# Patient Record
Sex: MALE | Race: WHITE | NOT HISPANIC OR LATINO | URBAN - METROPOLITAN AREA
[De-identification: names, ages, dates, MRNs, and addresses within clinical notes are randomized per-mention and may not be internally consistent; named-entity substitution may affect disease eponyms.]

---

## 2017-02-09 ENCOUNTER — OFFICE VISIT (OUTPATIENT)
Dept: FAMILY MEDICINE | Facility: CLINIC | Age: 47
End: 2017-02-09

## 2017-02-09 VITALS
DIASTOLIC BLOOD PRESSURE: 91 MMHG | HEIGHT: 68 IN | TEMPERATURE: 97.5 F | WEIGHT: 197.75 LBS | SYSTOLIC BLOOD PRESSURE: 137 MMHG | HEART RATE: 75 BPM | OXYGEN SATURATION: 100 % | BODY MASS INDEX: 29.97 KG/M2

## 2017-02-09 DIAGNOSIS — R05.3 PERSISTENT COUGH FOR 3 WEEKS OR LONGER: Primary | ICD-10-CM

## 2017-02-09 DIAGNOSIS — J04.0 LARYNGITIS: ICD-10-CM

## 2017-02-09 RX ORDER — AZITHROMYCIN 250 MG/1
TABLET, FILM COATED ORAL
Qty: 6 TABLET | Refills: 0 | Status: SHIPPED | OUTPATIENT
Start: 2017-02-09 | End: 2018-07-18

## 2017-02-09 RX ORDER — PREDNISONE 20 MG/1
20 TABLET ORAL DAILY
Qty: 5 TABLET | Refills: 0 | Status: SHIPPED | OUTPATIENT
Start: 2017-02-09 | End: 2019-07-03

## 2017-02-09 ASSESSMENT — PAIN SCALES - GENERAL: PAINLEVEL: NO PAIN (0)

## 2017-02-09 NOTE — NURSING NOTE
"46 year old  Chief Complaint   Patient presents with     Cough     x3 weeks productive in the morning during the day dry cough        Blood pressure 137/91, pulse 75, temperature 97.5  F (36.4  C), temperature source Oral, height 5' 8\" (172.7 cm), weight 197 lb 12 oz (89.699 kg), SpO2 100 %. Body mass index is 30.07 kg/(m^2).  Patient Active Problem List   Diagnosis     Essential hypertension with goal blood pressure less than 140/90       Wt Readings from Last 2 Encounters:   02/09/17 197 lb 12 oz (89.699 kg)   08/29/16 196 lb 1.9 oz (88.959 kg)     BP Readings from Last 3 Encounters:   02/09/17 137/91   08/29/16 137/85   04/25/16 198/134         Current Outpatient Prescriptions   Medication     lisinopril (PRINIVIL,ZESTRIL) 10 MG tablet     ketoconazole (NIZORAL) 200 MG tablet     No current facility-administered medications for this visit.       Social History   Substance Use Topics     Smoking status: Never Smoker      Smokeless tobacco: Never Used     Alcohol Use: Yes       Health Maintenance Due   Topic Date Due     LIPID SCREEN Q5 YR MALE (SYSTEM ASSIGNED)  06/05/2006     INFLUENZA VACCINE (SYSTEM ASSIGNED)  09/01/2016       No results found for this basename: pap      February 9, 2017 10:20 AM  "

## 2017-02-09 NOTE — PROGRESS NOTES
"CHIEF COMPLAINT:  Persistent cough.      HISTORY OF PRESENT ILLNESS:  Wiflredo is a 46-year-old who has been sick for about 3 weeks.  When it started, he had classic influenza-like symptoms (i.e., sudden onset, significant cough, body aches, chills, sense that he had been \"hit by a truck\", etc.) with the exception of no fever.  Since then, he has had a persistent cough.  It is worse in the morning.  It is often productive of dark brown sputum.  He had tried over-the-counter naproxen to help with the frequency of the cough and that helped.  However, it is still there, somewhat painful, productive of brown sputum.      No body aches or chills.  No fever.  No facial pain or pressure.  Throat feels fine, although he has now got some laryngitis.      He has essential hypertension and blood pressure is typically controlled with lisinopril 10 mg daily.  He has been on that now for about 6 months.  It is generally working and he is certain that his cough is not related to side effects.  He has not had the dry cough that we would expect.  He is not a smoker.      His 15-year-old son was sick, his wife then got sick, then Wilfredo got sick.  They have recovered fine.      OBJECTIVE:  Wilfredo is in no distress.  Blood pressure more or less acceptable at this point at 137/91, which is a marked improvement from 198/134 back in April.  Pulse 75 and regular.  Temperature is 97.5.  He is 5 feet 8 and weighs 197 with a BMI of 30.1.  Weight is essentially what it was in August.  O2 sats 100% on room air.  He was coughing frequently throughout our visit.  He was not using any accessory muscles.  He looked at rest and no diaphoresis or tachypnea present.  Examination reveals no pain with palpation of the frontal or maxillary sinuses.  Nose is free of any congestion.  His eyes look fine.  Ears show no cerumen in the canals.  The TMs look good.  Mucous membranes are moist.  Throat does not look erythematous.  No anterior or posterior chain " adenopathy.  His voice does have a hoarse/laryngitic quality to it.  Lungs reveal clear breath sounds throughout.  However, deep inspiration followed by exhalation was enough to cause quite a bit of coughing.  Chest x-ray not obtained.  No labs at this point.      ASSESSMENT AND PLAN:  Persistent cough of 3 weeks or longer.  I am a bit concerned about atypical bacteria.  Nothing to overtly suggest a walking pneumonia, therefore, perhaps bronchitis due to atypical bacteria.     1.  We are going to go with prednisone 20 mg once daily to help decrease the coughing and also help with his laryngitis.  He will start that today.   2.  Azithromycin 250 mg, 2 tablets together now and 1 daily for the next 4 days.  This antibiotic, coupled with its strong anti-inflammatory effect, should be enough to turn the corner for him.   3.  He will call me with any problems or questions.  I would recommend relative vocal rest as much as he can.

## 2017-02-09 NOTE — MR AVS SNAPSHOT
After Visit Summary   2/9/2017    Wilfredo Ochoa    MRN: [id number]           Patient Information     Date Of Birth          1970        Visit Information        Provider Department      2/9/2017 10:20 AM Shiv Us MD [facility]        Today's Diagnoses     Persistent cough for 3 weeks or longer    -  1     Laryngitis            Follow-ups after your visit        Follow-up notes from your care team     Return if symptoms worsen or fail to improve.      Who to contact     Please call your clinic at [phone] to:    Ask questions about your health    Make or cancel appointments    Discuss your medicines    Learn about your test results    Speak to your doctor   If you have compliments or concerns about an experience at your clinic, or if you wish to file a complaint, please contact [facility] Patient Relations at [phone] or email us at [email or web address]         Additional Information About Your Visit        MyChart Information     [employer] gives you secure access to your electronic health record. If you see a primary care provider, you can also send messages to your care team and make appointments. If you have questions, please call your primary care clinic.  If you do not have a primary care provider, please call [phone] and they will assist you.      [employer] is an electronic gateway that provides easy, online access to your medical records. With [employer], you can request a clinic appointment, read your test results, renew a prescription or communicate with your care team.     To access your existing account, please contact your [facility] or call [phone] for assistance.        Care EveryWhere ID     This is your Care EveryWhere ID. This could be used by other organizations to access your [facility] medical records  [id number]        Your Vitals Were     Pulse Temperature Height BMI (Body Mass  "Index) Pulse Oximetry       75 97.5  F (36.4  C) (Oral) 5' 8\" (172.7 cm) 30.07 kg/m2 100%        Blood Pressure from Last 3 Encounters:   02/09/17 137/91   08/29/16 137/85   04/25/16 198/134    Weight from Last 3 Encounters:   02/09/17 197 lb 12 oz (89.699 kg)   08/29/16 196 lb 1.9 oz (88.959 kg)   04/25/16 199 lb (90.266 kg)              Today, you had the following     No orders found for display         Today's Medication Changes          These changes are accurate as of: 2/9/17 10:51 AM.  If you have any questions, ask your nurse or doctor.               Start taking these medicines.        Dose/Directions    azithromycin 250 MG tablet   Commonly known as:  ZITHROMAX   Used for:  Persistent cough for 3 weeks or longer   Started by:  Shiv Us MD        Take 2 po together now, then one daily for 4 more days   Quantity:  6 tablet   Refills:  0       predniSONE 20 MG tablet   Commonly known as:  DELTASONE   Used for:  Persistent cough for 3 weeks or longer   Started by:  Shiv Us MD        Dose:  20 mg   Take 1 tablet (20 mg) by mouth daily   Quantity:  5 tablet   Refills:  0            Where to get your medicines      These medications were sent to [facility] [address] [place], [place] - [address]  [address], [place] [address]     Phone:  [phone]    - azithromycin 250 MG tablet  - predniSONE 20 MG tablet             Primary Care Provider Office Phone # Fax #    Shiv Us [phone]       [facility] [address]  [facility] [address]        Thank you!     Thank you for choosing [facility]  for your care. Our goal is always to provide you with excellent care. Hearing back from our patients is one way we can continue to improve our services. Please take a few minutes to complete the written survey that you may receive in the mail after your visit with us. Thank you!             Your Updated Medication List - Protect others " around you: Learn how to safely use, store and throw away your medicines at www.disposemymeds.org.          This list is accurate as of: 2/9/17 10:51 AM.  Always use your most recent med list.                   Brand Name Dispense Instructions for use    azithromycin 250 MG tablet    ZITHROMAX    6 tablet    Take 2 po together now, then one daily for 4 more days       ketoconazole 200 MG tablet    NIZORAL    2 tablet    Take 1 po once only; then exercise to the point of sweating ~2 hours later.  Repeat in ~2 weeks if needed.       lisinopril 10 MG tablet    PRINIVIL/ZESTRIL    90 tablet    Take 1 tablet (10 mg) by mouth daily       predniSONE 20 MG tablet    DELTASONE    5 tablet    Take 1 tablet (20 mg) by mouth daily

## 2017-09-15 DIAGNOSIS — I10 ESSENTIAL HYPERTENSION WITH GOAL BLOOD PRESSURE LESS THAN 140/90: ICD-10-CM

## 2017-09-15 RX ORDER — LISINOPRIL 10 MG/1
10 TABLET ORAL DAILY
Qty: 90 TABLET | Refills: 0 | Status: SHIPPED | OUTPATIENT
Start: 2017-09-15 | End: 2017-11-08

## 2017-11-08 DIAGNOSIS — I10 ESSENTIAL HYPERTENSION WITH GOAL BLOOD PRESSURE LESS THAN 140/90: ICD-10-CM

## 2017-11-08 RX ORDER — LISINOPRIL 10 MG/1
10 TABLET ORAL DAILY
Qty: 90 TABLET | Refills: 0 | Status: SHIPPED | OUTPATIENT
Start: 2017-11-08 | End: 2018-03-05

## 2017-11-08 NOTE — TELEPHONE ENCOUNTER
Medication is being filled for 1 time refill only due to:  Patient needs labs BMP. -already ordered

## 2018-03-05 DIAGNOSIS — I10 ESSENTIAL HYPERTENSION WITH GOAL BLOOD PRESSURE LESS THAN 140/90: ICD-10-CM

## 2018-03-05 NOTE — TELEPHONE ENCOUNTER
Routing refill request to provider for review/approval because:  Anel given x1 and patient did not follow up, please advise  Labs not current:  Bmp, future already placed   Patient needs to be seen because it has been more than 1 year since last office visit.    Janett Oconnor RN  March 5, 2018 3:24 PM

## 2018-03-06 RX ORDER — LISINOPRIL 10 MG/1
10 TABLET ORAL DAILY
Qty: 30 TABLET | Refills: 0 | Status: SHIPPED | OUTPATIENT
Start: 2018-03-06 | End: 2018-05-09

## 2018-05-09 ENCOUNTER — OFFICE VISIT (OUTPATIENT)
Dept: FAMILY MEDICINE | Facility: CLINIC | Age: 48
End: 2018-05-09
Payer: COMMERCIAL

## 2018-05-09 VITALS
HEART RATE: 78 BPM | TEMPERATURE: 98.1 F | WEIGHT: 200 LBS | HEIGHT: 68 IN | SYSTOLIC BLOOD PRESSURE: 143 MMHG | BODY MASS INDEX: 30.31 KG/M2 | DIASTOLIC BLOOD PRESSURE: 87 MMHG | OXYGEN SATURATION: 96 %

## 2018-05-09 DIAGNOSIS — Z71.84 COUNSELING FOR TRAVEL: ICD-10-CM

## 2018-05-09 DIAGNOSIS — Z01.84 IMMUNITY STATUS TESTING: ICD-10-CM

## 2018-05-09 DIAGNOSIS — I10 ESSENTIAL HYPERTENSION WITH GOAL BLOOD PRESSURE LESS THAN 140/90: Primary | ICD-10-CM

## 2018-05-09 RX ORDER — LISINOPRIL 10 MG/1
10 TABLET ORAL DAILY
Qty: 90 TABLET | Refills: 4 | Status: SHIPPED | OUTPATIENT
Start: 2018-05-09 | End: 2019-07-03

## 2018-05-09 ASSESSMENT — PAIN SCALES - GENERAL: PAINLEVEL: NO PAIN (0)

## 2018-05-09 NOTE — MR AVS SNAPSHOT
"              After Visit Summary   5/9/2018    Wilfredo Ochoa    MRN: [id number]           Patient Information     Date Of Birth          1970        Visit Information        Provider Department      5/9/2018 10:40 AM Shiv Us MD [facility]         Follow-ups after your visit        Who to contact     Please call your clinic at [phone] to:    Ask questions about your health    Make or cancel appointments    Discuss your medicines    Learn about your test results    Speak to your doctor            Additional Information About Your Visit        MyChart Information     [employer] gives you secure access to your electronic health record. If you see a primary care provider, you can also send messages to your care team and make appointments. If you have questions, please call your primary care clinic.  If you do not have a primary care provider, please call [phone] and they will assist you.      [employer] is an electronic gateway that provides easy, online access to your medical records. With [facility], you can request a clinic appointment, read your test results, renew a prescription or communicate with your care team.     To access your existing account, please contact your [facility] or call [phone] for assistance.        Care EveryWhere ID     This is your Care EveryWhere ID. This could be used by other organizations to access your [facility] medical records  [id number]        Your Vitals Were     Pulse Temperature Height Pulse Oximetry BMI (Body Mass Index)       78 98.1  F (36.7  C) (Temporal) 5' 7.99\" (172.7 cm) 96% 30.42 kg/m2        Blood Pressure from Last 3 Encounters:   05/09/18 143/87   02/09/17 (!) 137/91   08/29/16 137/85    Weight from Last 3 Encounters:   05/09/18 200 lb (90.7 kg)   02/09/17 197 lb 12 oz (89.7 kg)   08/29/16 196 lb 1.9 oz (89 kg)              Today, you had the following     No orders found for display       Primary Care " Provider Office Phone # Fax #    Shiv Us -[phone]       [address] [address]        Equal Access to Services     JOSEPH GRAVES : Hadii aad ku haddennisfunmilayo Srinivasan, waterryda luqadaha, qaybta kalizyda sera, randell christensen fredisrene gan teresa crowe. [facility] [phone].    ATENCIÓN: Si habla español, tiene a hurley disposición servicios gratuitos de asistencia lingüística. Llame al [phone].    We comply with applicable federal civil rights laws and [place] laws. We do not discriminate on the basis of race, color, national origin, age, disability, sex, sexual orientation, or gender identity.            Thank you!     Thank you for choosing [facility]  for your care. Our goal is always to provide you with excellent care. Hearing back from our patients is one way we can continue to improve our services. Please take a few minutes to complete the written survey that you may receive in the mail after your visit with us. Thank you!             Your Updated Medication List - Protect others around you: Learn how to safely use, store and throw away your medicines at www.disposemymeds.org.          This list is accurate as of 5/9/18 11:11 AM.  Always use your most recent med list.                   Brand Name Dispense Instructions for use Diagnosis    azithromycin 250 MG tablet    ZITHROMAX    6 tablet    Take 2 po together now, then one daily for 4 more days    Persistent cough for 3 weeks or longer       ketoconazole 200 MG tablet    NIZORAL    2 tablet    Take 1 po once only; then exercise to the point of sweating ~2 hours later.  Repeat in ~2 weeks if needed.    Pityriasis rosea       lisinopril 10 MG tablet    PRINIVIL/ZESTRIL    30 tablet    Take 1 tablet (10 mg) by mouth daily Needs labs and visit  for further refills    Essential hypertension with goal blood pressure less than 140/90       predniSONE 20 MG tablet    DELTASONE    5 tablet    Take 1 tablet (20 mg)  by mouth daily    Persistent cough for 3 weeks or longer

## 2018-05-09 NOTE — NURSING NOTE
Screening Questionnaire for Adult Immunization    Are you sick today?   No   Do you have allergies to medications, food, a vaccine component or latex?   No   Have you ever had a serious reaction after receiving a vaccination?   No   Do you have a long-term health problem with heart disease, lung disease, asthma, kidney disease, metabolic disease (e.g. diabetes), anemia, or other blood disorder?   No   Do you have cancer, leukemia, HIV/AIDS, or any other immune system problem?   No   In the past 3 months, have you taken medications that affect  your immune system, such as prednisone, other steroids, or anticancer drugs; drugs for the treatment of rheumatoid arthritis, Crohn s disease, or psoriasis; or have you had radiation treatments?   No   Have you had a seizure, or a brain or other nervous system problem?   No   During the past year, have you received a transfusion of blood or blood     products, or been given immune (gamma) globulin or antiviral drug?   No   For women: Are you pregnant or is there a chance you could become        pregnant during the next month?   No   Have you received any vaccinations in the past 4 weeks?   No     Immunization questionnaire answers were all negative.        Per orders of Dr. Us, injection of Typhoid given by Leida Iraheta. Patient instructed to remain in clinic for 15 minutes afterwards, and to report any adverse reaction to me immediately.       Screening performed by Leida Iraheta on 5/9/2018 at 1:09 PM.

## 2018-05-09 NOTE — NURSING NOTE
"47 year old  Chief Complaint   Patient presents with     Recheck Medication     Needs refill on BP Rx.        Blood pressure 143/87, pulse 78, temperature 98.1  F (36.7  C), temperature source Temporal, height 5' 7.99\" (172.7 cm), weight 200 lb (90.7 kg), SpO2 96 %. Body mass index is 30.42 kg/(m^2).  Patient Active Problem List   Diagnosis     Essential hypertension with goal blood pressure less than 140/90       Wt Readings from Last 2 Encounters:   05/09/18 200 lb (90.7 kg)   02/09/17 197 lb 12 oz (89.7 kg)     BP Readings from Last 3 Encounters:   05/09/18 143/87   02/09/17 (!) 137/91   08/29/16 137/85         Current Outpatient Prescriptions   Medication     azithromycin (ZITHROMAX) 250 MG tablet     ketoconazole (NIZORAL) 200 MG tablet     lisinopril (PRINIVIL/ZESTRIL) 10 MG tablet     predniSONE (DELTASONE) 20 MG tablet     No current facility-administered medications for this visit.        Social History   Substance Use Topics     Smoking status: Never Smoker     Smokeless tobacco: Never Used     Alcohol use Yes       Health Maintenance Due   Topic Date Due     HIV SCREEN (SYSTEM ASSIGNED)  06/05/1989     LIPID SCREEN Q5 YR MALE (SYSTEM ASSIGNED)  06/05/2006       No results found for: KATHY Iraheta MA  May 9, 2018 10:54 AM    "

## 2018-05-10 LAB — HAV IGG SER QL IA: REACTIVE

## 2018-05-15 NOTE — PROGRESS NOTES
CHIEF COMPLAINT:  Hypertension followup.      HISTORY OF PRESENT ILLNESS:  Wilfredo is a 47-year-old with essential hypertension.  He is currently on lisinopril 10 mg daily.  He takes it daily.  His weight is up just a little bit from a little over a year ago from 197 to 200.  He is not as physically active as he would like to be.  He has had a lot of stress.  His father was diagnosed with CLL not too long ago and treated it.  He had some back pain recently and has now been diagnosed with multiple myeloma.  There has been some thought about having Wilfredo undergo some genetic counseling and he will look into this down the road.      In addition to the above, he is traveling to [place] this summer with his organization, [employer].  He had a previous hepatitis A immunization but never the second one though this is unclear.  We will go ahead and check an antibody titer.  He needs a typhoid booster.  We discussed taking Pepto-Bismol once daily starting the day before the trip, throughout his time in [place], and perhaps the day after that.      OBJECTIVE:  Wilfredo is in no distress.  Affect upbeat.  /87.        ASSESSMENT AND PLAN:     1.  He will continue to check his blood pressure at home, something he has not been doing regularly.  We can easily increase the lisinopril from 10 to 20 mg but we will hold off on doing so for now until we know a little bit more.      2.  Travel counseling.  Typhoid immunization given today.  This will be good for 2-3 years of protection.  Hepatitis A antibody titer pending.  Call with any problems or questions.

## 2018-07-18 DIAGNOSIS — R05.3 PERSISTENT COUGH FOR 3 WEEKS OR LONGER: ICD-10-CM

## 2018-07-18 RX ORDER — AZITHROMYCIN 250 MG/1
TABLET, FILM COATED ORAL
Qty: 6 TABLET | Refills: 0 | Status: SHIPPED | OUTPATIENT
Start: 2018-07-18 | End: 2019-07-03

## 2018-07-19 DIAGNOSIS — L42 PITYRIASIS ROSEA: ICD-10-CM

## 2018-07-19 RX ORDER — KETOCONAZOLE 200 MG/1
TABLET ORAL
Qty: 2 TABLET | Refills: 1 | Status: SHIPPED | OUTPATIENT
Start: 2018-07-19 | End: 2019-07-03

## 2019-07-03 ENCOUNTER — OFFICE VISIT (OUTPATIENT)
Dept: FAMILY MEDICINE | Facility: CLINIC | Age: 49
End: 2019-07-03
Payer: COMMERCIAL

## 2019-07-03 VITALS
BODY MASS INDEX: 30.34 KG/M2 | WEIGHT: 199.5 LBS | DIASTOLIC BLOOD PRESSURE: 78 MMHG | TEMPERATURE: 97.6 F | HEART RATE: 96 BPM | RESPIRATION RATE: 16 BRPM | OXYGEN SATURATION: 96 % | SYSTOLIC BLOOD PRESSURE: 121 MMHG

## 2019-07-03 DIAGNOSIS — R73.09 ELEVATED GLUCOSE: ICD-10-CM

## 2019-07-03 DIAGNOSIS — L82.1 SEBORRHEIC KERATOSES: ICD-10-CM

## 2019-07-03 DIAGNOSIS — I10 ESSENTIAL HYPERTENSION WITH GOAL BLOOD PRESSURE LESS THAN 140/90: Primary | ICD-10-CM

## 2019-07-03 DIAGNOSIS — Z13.220 SCREENING FOR LIPID DISORDERS: ICD-10-CM

## 2019-07-03 LAB
ALBUMIN SERPL-MCNC: 4 G/DL (ref 3.3–4.6)
ALP SERPL-CCNC: 53 U/L (ref 40–150)
ALT SERPL-CCNC: 42 U/L (ref 0–70)
AST SERPL-CCNC: 33 U/L (ref 0–55)
BILIRUB SERPL-MCNC: 0.6 MG/DL (ref 0.2–1.3)
BUN SERPL-MCNC: 21 MG/DL (ref 5–24)
CALCIUM SERPL-MCNC: 9.6 MG/DL (ref 8.5–10.4)
CHLORIDE SERPLBLD-SCNC: 102 MMOL/L (ref 94–109)
CHOLEST SERPL-MCNC: 162 MG/DL (ref 0–200)
CHOLEST/HDLC SERPL: 4.8 {RATIO} (ref 0–5)
CO2 SERPL-SCNC: 27 MMOL/L (ref 20–32)
CREAT SERPL-MCNC: 1.2 MG/DL (ref 0.8–1.5)
EGFR CALCULATED (BLACK REFERENCE): 83.1
EGFR CALCULATED (NON BLACK REFERENCE): 68.7
FASTING SPECIMEN: NO
GLUCOSE SERPL-MCNC: 143 MG/DL (ref 60–109)
HBA1C MFR BLD: 5.9 % (ref 4.1–5.7)
HDLC SERPL-MCNC: 34 MG/DL
LDLC SERPL CALC-MCNC: 76 MG/DL (ref 0–129)
POTASSIUM SERPL-SCNC: 3.8 MMOL/L (ref 3.4–5.3)
PROT SERPL-MCNC: 6.9 G/DL (ref 6.8–8.8)
SODIUM SERPL-SCNC: 135 MMOL/L (ref 137.3–146.3)
TRIGL SERPL-MCNC: 260 MG/DL (ref 0–150)
VLDL-CHOLESTEROL: 52 (ref 7–32)

## 2019-07-03 RX ORDER — LISINOPRIL 10 MG/1
10 TABLET ORAL DAILY
Qty: 90 TABLET | Refills: 4 | Status: SHIPPED | OUTPATIENT
Start: 2019-07-03 | End: 2020-07-08

## 2019-07-03 NOTE — NURSING NOTE
49 year old  Chief Complaint   Patient presents with     Refill Request     Lisinopril     Skin Spots     Hair line and face       Blood pressure 140/87, pulse 88, temperature 97.6  F (36.4  C), temperature source Oral, resp. rate 16, weight 90.5 kg (199 lb 8 oz), SpO2 96 %. Body mass index is 30.34 kg/m .  Patient Active Problem List   Diagnosis     Essential hypertension with goal blood pressure less than 140/90       Wt Readings from Last 2 Encounters:   07/03/19 90.5 kg (199 lb 8 oz)   05/09/18 90.7 kg (200 lb)     BP Readings from Last 3 Encounters:   07/03/19 140/87   05/09/18 143/87   02/09/17 (!) 137/91         Current Outpatient Medications   Medication     lisinopril (PRINIVIL/ZESTRIL) 10 MG tablet     azithromycin (ZITHROMAX) 250 MG tablet     ketoconazole (NIZORAL) 200 MG tablet     predniSONE (DELTASONE) 20 MG tablet     No current facility-administered medications for this visit.        Social History     Tobacco Use     Smoking status: Never Smoker     Smokeless tobacco: Never Used   Substance Use Topics     Alcohol use: Yes     Drug use: No       Health Maintenance Due   Topic Date Due     PREVENTIVE CARE VISIT  1970     HIV SCREENING  06/05/1986     LIPID  06/05/2006       No results found for: PAP      July 3, 2019 2:10 PM

## 2019-07-03 NOTE — PROGRESS NOTES
CHIEF COMPLAINT:  Skin spots       HISTORY OF PRESENT ILLNESS: Wilfredo Ochoa is a 49 year old male who presents with concerns of skin spots located on his hair line. He had a spot on his face for many years, but is no longer there. He states his father had cancerous spots that were removed.     He has a history of hypertension and is controlled well with lisinopril 10mg daily. He is requesting refills today.     FAMILY, SOCIAL AND PAST SURGICAL HISTORIES:  Unchanged.       MEDICATIONS:  Carefully reviewed.       REVIEW OF SYSTEMS:  A 10-point review is negative except as otherwise noted.      OBJECTIVE:    GENERAL: Wilfredo is in no distress.  Affect is upbeat.     VITAL SIGNS: /87 (BP Location: Right arm, Patient Position: Sitting, Cuff Size: Adult Regular)   Pulse 88   Temp 97.6  F (36.4  C) (Oral)   Resp 16   Wt 90.5 kg (199 lb 8 oz)   SpO2 96%   BMI 30.34 kg/m    HEENT:  Head is normocephalic, atraumatic.  LUNGS:  Clear to auscultation bilaterally.   HEART:  Regular rate and rhythm without murmur.   SKIN:  Warm and dry. Several brown, slightly raised papules noted in right scalp area. Appear benign.      LABORATORY DATA: CMP, lipid panel, pending      ASSESSMENT AND PLAN:   1. Essential hypertension. Refilled lisinopril    2. Several benign seborrheic keratoses. Reassurance was given. May want to use an OTC liquid N2 kit to freeze them at home, but not necessary at this time.  3. Screening for lipid disorders. Lipid panel, pending    Call with any problems or questions.     I, Wu Nelson, am serving as a scribe to document services personally performed by Dr. Shiv Us, based on data collection and the provider's statements to me. Dr. Us has reviewed, edited, and approved the above note.     --Shiv Us MD

## 2019-07-14 PROBLEM — L82.1 SEBORRHEIC KERATOSES: Status: ACTIVE | Noted: 2019-07-14

## 2019-09-17 ENCOUNTER — OFFICE VISIT (OUTPATIENT)
Dept: FAMILY MEDICINE | Facility: CLINIC | Age: 49
End: 2019-09-17
Payer: COMMERCIAL

## 2019-09-17 VITALS
SYSTOLIC BLOOD PRESSURE: 128 MMHG | DIASTOLIC BLOOD PRESSURE: 89 MMHG | BODY MASS INDEX: 30.61 KG/M2 | WEIGHT: 195 LBS | HEIGHT: 67 IN | OXYGEN SATURATION: 97 % | TEMPERATURE: 98.2 F | HEART RATE: 82 BPM

## 2019-09-17 DIAGNOSIS — H02.823 EYELID CYST, RIGHT: ICD-10-CM

## 2019-09-17 DIAGNOSIS — L91.8 SKIN TAG: ICD-10-CM

## 2019-09-17 DIAGNOSIS — Z00.00 PREVENTATIVE HEALTH CARE: Primary | ICD-10-CM

## 2019-09-17 ASSESSMENT — MIFFLIN-ST. JEOR: SCORE: 1712.01

## 2019-09-17 NOTE — NURSING NOTE
"49 year old  Chief Complaint   Patient presents with     Physical     Derm Problem     skin tags and growth on right eye lid wants looked at       Blood pressure 128/89, pulse 82, temperature 98.2  F (36.8  C), temperature source Oral, height 1.7 m (5' 6.93\"), weight 88.5 kg (195 lb), SpO2 97 %. Body mass index is 30.61 kg/m .  Patient Active Problem List   Diagnosis     Essential hypertension with goal blood pressure less than 140/90     Seborrheic keratoses       Wt Readings from Last 2 Encounters:   09/17/19 88.5 kg (195 lb)   07/03/19 90.5 kg (199 lb 8 oz)     BP Readings from Last 3 Encounters:   09/17/19 128/89   07/03/19 121/78   05/09/18 143/87         Current Outpatient Medications   Medication     lisinopril (PRINIVIL/ZESTRIL) 10 MG tablet     No current facility-administered medications for this visit.        Social History     Tobacco Use     Smoking status: Never Smoker     Smokeless tobacco: Never Used   Substance Use Topics     Alcohol use: Yes     Drug use: No       Health Maintenance Due   Topic Date Due     PREVENTIVE CARE VISIT  1970     HIV SCREENING  06/05/1986     INFLUENZA VACCINE (1) 09/01/2019       No results found for: PAP      September 17, 2019 1:37 PM    "

## 2019-09-17 NOTE — PROGRESS NOTES
"CHIEF COMPLAINT: Annual Physical      HISTORY OF PRESENT ILLNESS: Wilfredo Ochoa is a 49 year old male who presents for an annual physical. Overall, he is doing well. 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     He has a history of hypertension and is taking lisinopril 10 mg daily. He is interested if he will be able to get off this medication. He has been exercising more frequently and is down 4 pounds since 7/3/2019.     He has a growth present on his right eye lid that has been increasing in size. It has bothered him lately because of its bigger size.     He has skin tags and is wondering if there is an at-home treatment he can purchase to remove these.       FAMILY, SOCIAL AND PAST SURGICAL HISTORIES:  Unchanged.       MEDICATIONS:  Carefully reviewed.       HEALTHCARE MAINTENANCE:    Health Maintenance   Topic Date Due     PREVENTIVE CARE VISIT  1971     HIV SCREENING  06/05/1986     INFLUENZA VACCINE (1) 09/01/2019     LIPID  07/03/2024     DTAP/TDAP/TD IMMUNIZATION (2 - Td) 04/17/2025     PHQ-2  Completed     IPV IMMUNIZATION  Aged Out     MENINGITIS IMMUNIZATION  Aged Out     Eye exam: He wears readers and has his eyes checked annually.   Hearing: No concerns  Dental: Up to date  BP: 128/89  BMI: 30.61, down 4 pounds since 7/03/2019  Immunizations: Tdap up to date until 2025.   Colonoscopy: No family history of colon cancer. Will wait until age 51.   PSA: He believes his father had prostatitis.     REVIEW OF SYSTEMS:  A 10-point review is negative.       OBJECTIVE:    GENERAL: Wilfredo Ochoa is in no distress.  Affect is upbeat.     VITAL SIGNS: /89 (BP Location: Right arm, Patient Position: Sitting, Cuff Size: Adult Large)   Pulse 82   Temp 98.2  F (36.8  C) (Oral)   Ht 1.7 m (5' 6.93\")   Wt 88.5 kg (195 lb)   SpO2 97%   BMI 30.61 kg/m    HEENT:  Head is normocephalic, atraumatic. Ears clear bilaterally. No pain with palpation of the frontal or maxillary sinuses. "  Eyes are grossly normal. There is a small cyst-like growth on the inner aspect of the right upper eyelid. Nose is free of any congestion or discharge.  Teeth in good repair.  Mucous membranes are moist.  Throat looks benign.  Neck is supple without adenopathy or thyromegaly.  No carotid bruits are heard, no JVD.   BACK:  Smooth and straight.  No pain to percussion.  No CVA tenderness.   LUNGS:  Clear to auscultation bilaterally.   HEART:  Regular rate and rhythm without murmur.   ABDOMEN:  Benign.     EXTREMITIES:  Ankles free of any edema.   SKIN:  Warm and dry.       LABORATORY DATA: Routine labs completed on 7/3/2019.     ASSESSMENT AND PLAN:   1. Adult physical exam, up to date on healthcare maintenance strategies.   2. Essential hypertension well controlled with lisinopril 10 mg. Discussed that he could possibly be taken off medication if he loses about 15 pounds. Encouraged him to continue weight loss efforts.   3. Growth on right eyelid: Referred to Dr. Tushar Pickard from ophthalmology for evaluation and probable removal.   4. Skin tags: Advised purchasing OTC liquid N2 kit to freeze them at home.  5. He will possibly be traveling to [place] and will get back to me regarding this so we can set up a travel visit for him.  6. F/U in ~1 year for preventive visit    Call with any problems or questions.     I, Wu Nelson, am serving as a scribe to document services personally performed by Dr. Shiv Us, based on data collection and the provider's statements to me. Dr. Us has reviewed, edited, and approved the above note.     --Shiv Us MD

## 2019-09-18 ENCOUNTER — PRE VISIT (OUTPATIENT)
Dept: OPHTHALMOLOGY | Facility: CLINIC | Age: 49
End: 2019-09-18

## 2019-09-18 NOTE — TELEPHONE ENCOUNTER
FUTURE VISIT INFORMATION      FUTURE VISIT INFORMATION:    Date: 10/14/19    Time: 9:30am    Location: [facility]  REFERRAL INFORMATION:    Referring provider:  Shiv Us    Referring providers clinic:  [facility]    Reason for visit/diagnosis  Eyelid cyst, right    RECORDS REQUESTED FROM:       Clinic name Comments Records Status Imaging Status   [facility] OV/referral 9/17/19- [facility]

## 2019-10-14 ENCOUNTER — OFFICE VISIT (OUTPATIENT)
Dept: OPHTHALMOLOGY | Facility: CLINIC | Age: 49
End: 2019-10-14
Payer: COMMERCIAL

## 2019-10-14 DIAGNOSIS — H02.9 EYELID LESION: Primary | ICD-10-CM

## 2019-10-14 RX ORDER — LIDOCAINE HYDROCHLORIDE AND EPINEPHRINE 10; 10 MG/ML; UG/ML
1 INJECTION, SOLUTION INFILTRATION; PERINEURAL ONCE
Status: COMPLETED | OUTPATIENT
Start: 2019-10-14 | End: 2019-10-14

## 2019-10-14 RX ORDER — ERYTHROMYCIN 5 MG/G
OINTMENT OPHTHALMIC ONCE
Status: COMPLETED | OUTPATIENT
Start: 2019-10-14 | End: 2019-10-14

## 2019-10-14 RX ADMIN — ERYTHROMYCIN: 5 OINTMENT OPHTHALMIC at 09:56

## 2019-10-14 RX ADMIN — LIDOCAINE HYDROCHLORIDE AND EPINEPHRINE 1 ML: 10; 10 INJECTION, SOLUTION INFILTRATION; PERINEURAL at 09:56

## 2019-10-14 ASSESSMENT — LAGOPHTHALMOS
OS_LAGOPHTHALMOS: 0
OD_LAGOPHTHALMOS: 0

## 2019-10-14 ASSESSMENT — EXTERNAL EXAM - RIGHT EYE: OD_EXAM: NORMAL

## 2019-10-14 ASSESSMENT — VISUAL ACUITY
OS_SC+: -1
METHOD: SNELLEN - LINEAR
OS_SC: 20/20
OD_SC: 20/20

## 2019-10-14 ASSESSMENT — SLIT LAMP EXAM - LIDS: COMMENTS: NORMAL

## 2019-10-14 ASSESSMENT — EXTERNAL EXAM - LEFT EYE: OS_EXAM: NORMAL

## 2019-10-14 ASSESSMENT — MARGIN REFLEX DISTANCE
OD_MRD1: 4
OS_MRD1: 4

## 2019-10-14 ASSESSMENT — CONF VISUAL FIELD
METHOD: COUNTING FINGERS
OS_NORMAL: 1
OD_NORMAL: 1

## 2019-10-14 ASSESSMENT — TONOMETRY
OD_IOP_MMHG: 13
IOP_METHOD: ICARE
OS_IOP_MMHG: 15

## 2019-10-14 NOTE — PATIENT INSTRUCTIONS
Tushar Pickard M.D.    POST OPERATIVE INSTRUCTIONS   OFFICE EYELID SURGERY      1. Ice pack immediately after surgery. Alternate ten minutes on and ten minutes off for the first 24 - 48 hours, while in a reclined position with two pillows under your head while awake.     2. You can use Tylenol extra strength for pain as directed on the bottle.     3. Sleep with two pillows under your head for the first night following surgery.     4.  If bandaged, remove the dressing 24 hours after surgery.     5. Use ointment along the incision both morning and evening until your return visit. If you get ointment in your eye, it will blur your vision slightly.     6. Avoid aspirin or anti-inflammatory medications such as Advil or Motrin for one week following your surgery unless otherwise directed.     7. Avoid strenuous activity or straining for the first week after surgery.     8. Bathing and showers are OK but to facilitate healing, do not wash or apply water to the sutured areas.     9. Small amounts of bright red blood normally stain the bandages. Some blurring of vision can be expected due to drainage and ointment in the eyes.     10. If your eyes swell shut, you cannot establish vision in the operated eye, or the pain is not reasonably controlled with medication you must contact the eye clinic immediately.     11. If you should have a problem after normal office hours, you can reach the ophthalmologist on call at [phone]. If for some reason no one can be reached, proceed to the nearest emergency room for evaluation and treatment.

## 2019-10-14 NOTE — PROGRESS NOTES
Chief Complaints and History of Present Illnesses   Patient presents with     Consult For     Chief Complaint(s) and History of Present Illness(es)     Consult For     In right eye.  Associated symptoms include Negative for dryness, redness,   tearing and eye pain.         Comments     Referred by Shiv Us for cyst of RE. The cyst has been on the RUL for   about 6 months to 1 year. Pt notes it is slowly getting larger as times   goes on. Pt denies any eye pain, does not leak any mattering or discharge.     Ocular meds: AT's PRN BE.     Yolis Arora, COMT 9:07 AM October 14, 2019     -Patient and wife have noticed gradual enlargement of right upper eyelid cyst. First noted 6mo-1yr  -Denies pain, drainage, bleeding, ulceration. No other similar lesions elsewhere         Assessment & Plan     Wilfredo Ochoa is a 49 year old male with the following diagnoses:   1. Eyelid lesion       -Likely hidrocystoma  -Anterior to lashes, discussed possible risk of lash loss  -Does not take blood thinners, would prefer to remove in procedure room    F/u for procedure - right upper eyelid lesion removal          Aliya Ponce MD  Oculoplastics Fellow

## 2019-10-14 NOTE — NURSING NOTE
Chief Complaint(s) and History of Present Illness(es)     Consult For     In right eye.  Associated symptoms include Negative for dryness, redness, tearing and eye pain.              Comments     Referred by Shiv Us for cyst of RE. The cyst has been on the RUL for about 6 months to 1 year. Pt notes it is slowly getting larger as times goes on. Pt denies any eye pain, does not leak any mattering or discharge.     Ocular meds: AT's PRN BE.     ZENA Suárez 9:07 AM October 14, 2019

## 2019-10-15 LAB — COPATH REPORT: NORMAL

## 2020-03-01 ENCOUNTER — HEALTH MAINTENANCE LETTER (OUTPATIENT)
Age: 50
End: 2020-03-01

## 2020-07-07 DIAGNOSIS — I10 ESSENTIAL HYPERTENSION WITH GOAL BLOOD PRESSURE LESS THAN 140/90: ICD-10-CM

## 2020-07-07 NOTE — TELEPHONE ENCOUNTER
Last office visit was on 09/17/2019, no future visit scheduled.  Last labs: 7/3/19    Medication is being filled for 1 time refill only due to:  Will be due for labs and visit before next refill  Bonita Thompson RN  07/08/20  12:17 PM

## 2020-07-08 RX ORDER — LISINOPRIL 10 MG/1
10 TABLET ORAL DAILY
Qty: 90 TABLET | Refills: 0 | Status: SHIPPED | OUTPATIENT
Start: 2020-07-08 | End: 2020-10-05

## 2020-10-05 DIAGNOSIS — I10 ESSENTIAL HYPERTENSION WITH GOAL BLOOD PRESSURE LESS THAN 140/90: ICD-10-CM

## 2020-10-05 RX ORDER — LISINOPRIL 10 MG/1
10 TABLET ORAL DAILY
Qty: 90 TABLET | Refills: 0 | Status: SHIPPED | OUTPATIENT
Start: 2020-10-05 | End: 2020-12-04

## 2020-10-05 NOTE — TELEPHONE ENCOUNTER
Last time prescribed: 7/8/20 , 90 tabs/caps x 0 refills  Last office visit: 9/17/19  Next appointment: No Future Appointment Scheduled      Medication is being filled for 1 time refill only due to:  Patient needs labs bmp. Future labs ordered NO, as also needs appt . Patient needs to be seen because it has been more than one year since last visit.       Janett Oconnor RN  October 5, 2020 4:14 PM

## 2020-12-04 DIAGNOSIS — I10 ESSENTIAL HYPERTENSION WITH GOAL BLOOD PRESSURE LESS THAN 140/90: ICD-10-CM

## 2020-12-04 RX ORDER — LISINOPRIL 10 MG/1
10 TABLET ORAL DAILY
Qty: 90 TABLET | Refills: 0 | Status: SHIPPED | OUTPATIENT
Start: 2020-12-04 | End: 2020-12-10

## 2020-12-04 NOTE — TELEPHONE ENCOUNTER
Last visit 9/17/19, no future visit  OK for 90 day refill of lisinopril x 1 per Dr Magen Santana message.  Montse Engel RN  [facility]

## 2020-12-10 ENCOUNTER — OFFICE VISIT (OUTPATIENT)
Dept: FAMILY MEDICINE | Facility: CLINIC | Age: 50
End: 2020-12-10
Payer: COMMERCIAL

## 2020-12-10 VITALS
OXYGEN SATURATION: 97 % | DIASTOLIC BLOOD PRESSURE: 86 MMHG | WEIGHT: 202 LBS | SYSTOLIC BLOOD PRESSURE: 123 MMHG | HEIGHT: 67 IN | TEMPERATURE: 97.1 F | BODY MASS INDEX: 31.71 KG/M2 | RESPIRATION RATE: 15 BRPM | HEART RATE: 71 BPM

## 2020-12-10 DIAGNOSIS — I10 ESSENTIAL HYPERTENSION WITH GOAL BLOOD PRESSURE LESS THAN 140/90: ICD-10-CM

## 2020-12-10 LAB
ANION GAP SERPL CALCULATED.3IONS-SCNC: 7 MMOL/L (ref 3–14)
BUN SERPL-MCNC: 17 MG/DL (ref 7–30)
CALCIUM SERPL-MCNC: 9.4 MG/DL (ref 8.5–10.1)
CHLORIDE SERPL-SCNC: 106 MMOL/L (ref 94–109)
CO2 SERPL-SCNC: 24 MMOL/L (ref 20–32)
CREAT SERPL-MCNC: 1.1 MG/DL (ref 0.66–1.25)
GFR SERPL CREATININE-BSD FRML MDRD: 78 ML/MIN/{1.73_M2}
GLUCOSE SERPL-MCNC: 122 MG/DL (ref 70–99)
POTASSIUM SERPL-SCNC: 4.8 MMOL/L (ref 3.4–5.3)
SODIUM SERPL-SCNC: 137 MMOL/L (ref 133–144)

## 2020-12-10 RX ORDER — LISINOPRIL 10 MG/1
10 TABLET ORAL DAILY
Qty: 90 TABLET | Refills: 4 | Status: SHIPPED | OUTPATIENT
Start: 2020-12-10 | End: 2022-01-17

## 2020-12-10 ASSESSMENT — MIFFLIN-ST. JEOR: SCORE: 1743.15

## 2020-12-10 NOTE — PROGRESS NOTES
CHIEF COMPLAINT:  Followup regarding hypertension medication.      HISTORY OF PRESENT ILLNESS:  Wilfredo is a 50 year old who has essential hypertension, and he is on lisinopril 10 mg once daily.  Blood pressure has ranged from 121-128/79-89 for the past year and a half or so.  He is tolerating the medication well.  No dry cough has developed.  No lightheadedness.      Wilfredo is 50, turning 51 in June.  We talked about coming back for an annual wellness visit when he turns 51.  At that point, we will discuss prostate cancer screening, colon cancer screening and other health maintenance issues.      ACTIVE MEDICAL PROBLEMS:  Reviewed.      CURRENT MEDICATIONS:  Reviewed.      OBJECTIVE:  Wilfredo is in absolutely no distress.  Breathing comfortably.  Blood pressure is 123/86 with a heart rate of 71 and regular.  Temperature is 97.1.  He is 5 feet 7 inches and weighs 202 pounds with a BMI of 31.4.  Weight is up about 7 pounds in the last 14 months.  O2 sats are 97% on room air.        LABORATORY:  Basic metabolic panel pending.  He declines a flu shot.      ASSESSMENT/PLAN:   1.  Essential hypertension under good control on lisinopril 10 mg daily.  Refill sent in for quantity #90 with 4 refills.  Basic metabolic panel pending.  He will be notified of the results.   2.  Follow up once he has turned 51 for an annual wellness visit.

## 2020-12-10 NOTE — NURSING NOTE
"50 year old  Chief Complaint   Patient presents with     Recheck Medication     lisinopril for future refills        Blood pressure 123/86, pulse 71, temperature 97.1  F (36.2  C), temperature source Skin, resp. rate 15, height 1.707 m (5' 7.21\"), weight 91.6 kg (202 lb), SpO2 97 %. Body mass index is 31.44 kg/m .  Patient Active Problem List   Diagnosis     Essential hypertension with goal blood pressure less than 140/90     Seborrheic keratoses     Preventative health care       Wt Readings from Last 2 Encounters:   12/10/20 91.6 kg (202 lb)   09/17/19 88.5 kg (195 lb)     BP Readings from Last 3 Encounters:   12/10/20 123/86   09/17/19 128/89   07/03/19 121/78         Current Outpatient Medications   Medication     lisinopril (ZESTRIL) 10 MG tablet     No current facility-administered medications for this visit.        Social History     Tobacco Use     Smoking status: Never Smoker     Smokeless tobacco: Never Used   Substance Use Topics     Alcohol use: Yes     Drug use: No       Health Maintenance Due   Topic Date Due     HIV SCREENING  06/05/1986     HEPATITIS C SCREENING  06/05/1989     INFLUENZA VACCINE (1) 09/01/2020     PREVENTIVE CARE VISIT  09/17/2020       No results found for: PAP      December 10, 2020 7:55 AM    "

## 2020-12-14 ENCOUNTER — HEALTH MAINTENANCE LETTER (OUTPATIENT)
Age: 50
End: 2020-12-14

## 2021-10-02 ENCOUNTER — HEALTH MAINTENANCE LETTER (OUTPATIENT)
Age: 51
End: 2021-10-02

## 2022-01-17 DIAGNOSIS — I10 ESSENTIAL HYPERTENSION WITH GOAL BLOOD PRESSURE LESS THAN 140/90: ICD-10-CM

## 2022-01-17 RX ORDER — LISINOPRIL 10 MG/1
10 TABLET ORAL DAILY
Qty: 30 TABLET | Refills: 0 | Status: SHIPPED | OUTPATIENT
Start: 2022-01-17 | End: 2022-01-24

## 2022-01-17 NOTE — TELEPHONE ENCOUNTER
Medication requested: lisinopril (ZESTRIL) 10 MG tablet  Last office visit: 12/20/20  [facility] appointments: none  Medication last refilled: 12/20/20 #90 + 4 refills  Last qualifying labs: 12/20/20    Medication is being filled for 1 time refill only due to:  Patient needs to be seen because it has been more than one year since last visit.  to contact patient to schedule an in-clinic visit for blood pressure management   Bonita Thompson MS RN-BC  01/17/22  9:23 AM

## 2022-01-19 ENCOUNTER — TELEPHONE (OUTPATIENT)
Dept: FAMILY MEDICINE | Facility: CLINIC | Age: 52
End: 2022-01-19
Payer: COMMERCIAL

## 2022-01-19 NOTE — TELEPHONE ENCOUNTER
----- Message from Bonita Thompson RN sent at 1/17/2022  9:20 AM CST -----  Regarding: Appointment Due  Scheduling -   Please call patient and assist with scheduling an appointment  Reason for appointment: blood pressure management, labs, med refill  Provider: Dr. Us  Due: overdue, last visit > 1 year ago; please schedule in January   Appointment type: in-clinic only    Thanks  Bonita

## 2022-01-22 ENCOUNTER — HEALTH MAINTENANCE LETTER (OUTPATIENT)
Age: 52
End: 2022-01-22

## 2022-01-24 ENCOUNTER — OFFICE VISIT (OUTPATIENT)
Dept: FAMILY MEDICINE | Facility: CLINIC | Age: 52
End: 2022-01-24
Payer: COMMERCIAL

## 2022-01-24 VITALS
SYSTOLIC BLOOD PRESSURE: 126 MMHG | RESPIRATION RATE: 15 BRPM | OXYGEN SATURATION: 99 % | DIASTOLIC BLOOD PRESSURE: 86 MMHG | TEMPERATURE: 98.2 F | BODY MASS INDEX: 30.86 KG/M2 | HEART RATE: 74 BPM | WEIGHT: 198.25 LBS

## 2022-01-24 DIAGNOSIS — R73.09 ELEVATED GLUCOSE: ICD-10-CM

## 2022-01-24 DIAGNOSIS — I10 ESSENTIAL HYPERTENSION WITH GOAL BLOOD PRESSURE LESS THAN 140/90: Primary | ICD-10-CM

## 2022-01-24 DIAGNOSIS — Z12.12 SCREENING FOR COLORECTAL CANCER: ICD-10-CM

## 2022-01-24 DIAGNOSIS — Z12.11 SCREENING FOR COLORECTAL CANCER: ICD-10-CM

## 2022-01-24 DIAGNOSIS — Z13.220 SCREENING FOR HYPERLIPIDEMIA: ICD-10-CM

## 2022-01-24 DIAGNOSIS — Z12.5 SCREENING FOR PROSTATE CANCER: ICD-10-CM

## 2022-01-24 LAB
ANION GAP SERPL CALCULATED.3IONS-SCNC: 5 MMOL/L (ref 3–14)
BUN SERPL-MCNC: 18 MG/DL (ref 7–30)
CALCIUM SERPL-MCNC: 9.7 MG/DL (ref 8.5–10.1)
CHLORIDE BLD-SCNC: 105 MMOL/L (ref 94–109)
CHOLEST SERPL-MCNC: 183 MG/DL
CO2 SERPL-SCNC: 28 MMOL/L (ref 20–32)
CREAT SERPL-MCNC: 1.02 MG/DL (ref 0.66–1.25)
FASTING STATUS PATIENT QL REPORTED: ABNORMAL
GFR SERPL CREATININE-BSD FRML MDRD: 90 ML/MIN/1.73M2
GLUCOSE BLD-MCNC: 131 MG/DL (ref 70–99)
HDLC SERPL-MCNC: 39 MG/DL
LDLC SERPL CALC-MCNC: 85 MG/DL
NONHDLC SERPL-MCNC: 144 MG/DL
POTASSIUM BLD-SCNC: 4.3 MMOL/L (ref 3.4–5.3)
PSA SERPL-MCNC: 0.4 UG/L (ref 0–4)
SODIUM SERPL-SCNC: 138 MMOL/L (ref 133–144)
TRIGL SERPL-MCNC: 294 MG/DL

## 2022-01-24 PROCEDURE — 80061 LIPID PANEL: CPT | Performed by: FAMILY MEDICINE

## 2022-01-24 PROCEDURE — 80048 BASIC METABOLIC PNL TOTAL CA: CPT | Performed by: FAMILY MEDICINE

## 2022-01-24 PROCEDURE — G0103 PSA SCREENING: HCPCS | Performed by: FAMILY MEDICINE

## 2022-01-24 RX ORDER — LISINOPRIL 10 MG/1
10 TABLET ORAL DAILY
Qty: 90 TABLET | Refills: 4 | Status: SHIPPED | OUTPATIENT
Start: 2022-01-24 | End: 2023-03-13

## 2022-01-24 NOTE — PROGRESS NOTES
CHIEF COMPLAINT: BP f/u      HISTORY OF PRESENT ILLNESS:  Wilfredo Ochoa is a 51 year old male with a history of HTN who presents for a blood pressure follow-up.     Wilfredo takes lisinopril 10 mg daily for the treatment of hypertension. He is tolerating this medication well without side effects. His BP is 126/86 today. He is due for updated labs and will need medication refills soon.    Wilfredo is also due for an updated lipid panel, which we will complete today. He has not completed a baseline screenings for prostate cancer or colon cancer. He would like to complete a PSA, ordered today. We discussed colon cancer screening options and he elects to use Cologuard, ordered today. He does not have a family history of colon cancer.      Wilfredo is due for the COVID-19 vaccine series, influenza vaccine, and Shingrix series. He declines an influenza vaccine today. He does not plan to receive the COVID vaccine series. We will revisit the Shingrix vaccines at his next annual wellness visit. Tdap booster will be due in 2028.    MEDICATIONS:   Carefully Reviewed      REVIEW OF SYSTEMS:  10-point review of systems negative unless otherwise stated in the HPI.       OBJECTIVE:    EXAM:  GENERAL: Wilfredo is in no distress.  Affect is upbeat.   Alert and oriented x3  /86 (BP Location: Right arm, Patient Position: Sitting, Cuff Size: Adult Large)   Pulse 74   Temp 98.2  F (36.8  C) (Skin)   Resp 15   Wt 89.9 kg (198 lb 4 oz)   SpO2 99%   BMI 30.86 kg/m    HEENT:  Head is normocephalic, atraumatic.  Eyes are grossly normal.  Nose and mouth masked.   LUNGS:  Clear to auscultation bilaterally.  HEART:  Regular rate and rhythm without murmur.  Normal S1/S2.  No carotid bruits heard.  SKIN:  Warm and dry.       LABORATORY DATA:   No results found for any visits on 01/24/22.    ASSESSMENT AND PLAN:   1. Essential hypertension with goal blood pressure less than 140/90: I have sent in a refill with a hold at the pharmacy  for Wilfredo's lisinopril 10 mg tablets, 90 tablets with four additional refills. BMP completed today, results pending.   2. Screening for hyperlipidemia: Lipid profile completed today, results pending. Wilfredo is fasting today.   3. Screening for prostate cancer: PSA completed today, results pending.  4. Screening for colon cancer: Cologuard ordered today.     I, Katharine Fuller, am serving as a scribe to document services personally performed by Dr. Shiv Us, based on data collection and the provider's statements to me. Dr. Us has reviewed, edited, and approved the above note.     I, Dr. Shiv Us, have interviewed and examined this patient, and I have thoroughly reviewed and edited this note and agree with its contents.

## 2022-01-24 NOTE — NURSING NOTE
51 year old  Chief Complaint   Patient presents with     Hypertension     Recheck Medication     and labs        Blood pressure 126/86, pulse 74, temperature 98.2  F (36.8  C), temperature source Skin, resp. rate 15, weight 89.9 kg (198 lb 4 oz), SpO2 99 %. Body mass index is 30.86 kg/m .  Patient Active Problem List   Diagnosis     Essential hypertension with goal blood pressure less than 140/90     Seborrheic keratoses     Preventative health care       Wt Readings from Last 2 Encounters:   01/24/22 89.9 kg (198 lb 4 oz)   12/10/20 91.6 kg (202 lb)     BP Readings from Last 3 Encounters:   01/24/22 126/86   12/10/20 123/86   09/17/19 128/89         Current Outpatient Medications   Medication     lisinopril (ZESTRIL) 10 MG tablet     No current facility-administered medications for this visit.       Social History     Tobacco Use     Smoking status: Never Smoker     Smokeless tobacco: Never Used   Substance Use Topics     Alcohol use: Yes     Drug use: No       Health Maintenance Due   Topic Date Due     ADVANCE CARE PLANNING  Never done     COVID-19 Vaccine (1) Never done     COLORECTAL CANCER SCREENING  Never done     HIV SCREENING  Never done     HEPATITIS C SCREENING  Never done     PREVENTIVE CARE VISIT  09/17/2020     INFLUENZA VACCINE (1) 09/01/2021     ZOSTER IMMUNIZATION (1 of 2) 06/05/2021       No results found for: PAP      January 24, 2022 10:13 AM

## 2022-01-25 LAB — HBA1C MFR BLD: 6.5 % (ref 0–5.6)

## 2023-01-14 ENCOUNTER — HEALTH MAINTENANCE LETTER (OUTPATIENT)
Age: 53
End: 2023-01-14

## 2023-03-13 DIAGNOSIS — I10 ESSENTIAL HYPERTENSION WITH GOAL BLOOD PRESSURE LESS THAN 140/90: ICD-10-CM

## 2023-03-13 RX ORDER — LISINOPRIL 10 MG/1
10 TABLET ORAL DAILY
Qty: 30 TABLET | Refills: 0 | Status: SHIPPED | OUTPATIENT
Start: 2023-03-13 | End: 2023-05-02

## 2023-03-13 NOTE — TELEPHONE ENCOUNTER
Lisinopril (Zestril) 10 mg    Last Office Visit: 1/24/22  [facility] Appointments: None  Medication last refilled: 1/24/22 #90 with 4 refill(s)    Vital Signs 9/17/2019 12/10/2020 1/24/2022   Systolic 128 123 126   Diastolic 89 86 86     Required labs per protocol:    LAB REF RANGE 12/10/20 1/24/22   Creatinine 0.66-1.25 mg/dL 1.10 1.02   Potassium 3.4-5.3 mmol/L 4.8 4.3     Medication is being filled for 1 time refill only due to:  Patient needs labs BMP. Patient needs to be seen because it has been more than one year since last visit.     Infinio message sent to Wilfredo to call and schedule an appointment.    [place], CARLON, RN, CCM

## 2023-04-04 ENCOUNTER — OFFICE VISIT (OUTPATIENT)
Dept: FAMILY MEDICINE | Facility: CLINIC | Age: 53
End: 2023-04-04
Payer: COMMERCIAL

## 2023-04-04 DIAGNOSIS — I10 ESSENTIAL HYPERTENSION WITH GOAL BLOOD PRESSURE LESS THAN 140/90: Primary | ICD-10-CM

## 2023-04-04 DIAGNOSIS — R73.09 ELEVATED GLUCOSE LEVEL: ICD-10-CM

## 2023-04-04 DIAGNOSIS — E11.9 TYPE 2 DIABETES MELLITUS WITHOUT COMPLICATION, WITHOUT LONG-TERM CURRENT USE OF INSULIN (H): ICD-10-CM

## 2023-04-04 LAB
ANION GAP SERPL CALCULATED.3IONS-SCNC: 12 MMOL/L (ref 7–15)
BUN SERPL-MCNC: 15.8 MG/DL (ref 6–20)
CALCIUM SERPL-MCNC: 10 MG/DL (ref 8.6–10)
CHLORIDE SERPL-SCNC: 102 MMOL/L (ref 98–107)
CREAT SERPL-MCNC: 1.13 MG/DL (ref 0.67–1.17)
DEPRECATED HCO3 PLAS-SCNC: 23 MMOL/L (ref 22–29)
GFR SERPL CREATININE-BSD FRML MDRD: 79 ML/MIN/1.73M2
GLUCOSE SERPL-MCNC: 185 MG/DL (ref 70–99)
HBA1C MFR BLD: 7.6 % (ref 0–5.6)
POTASSIUM SERPL-SCNC: 4.2 MMOL/L (ref 3.4–5.3)
SODIUM SERPL-SCNC: 137 MMOL/L (ref 136–145)

## 2023-04-04 PROCEDURE — 80048 BASIC METABOLIC PNL TOTAL CA: CPT | Performed by: FAMILY MEDICINE

## 2023-04-04 NOTE — NURSING NOTE
52 year old  Chief Complaint   Patient presents with     Hypertension     BP check        Blood pressure (!) 150/98, pulse 91, temperature 98.1  F (36.7  C), temperature source Skin, resp. rate 15, weight 92.5 kg (204 lb), SpO2 96 %. Body mass index is 31.76 kg/m .  Patient Active Problem List   Diagnosis     Essential hypertension with goal blood pressure less than 140/90     Seborrheic keratoses     Preventative health care       Wt Readings from Last 2 Encounters:   04/04/23 92.5 kg (204 lb)   01/24/22 89.9 kg (198 lb 4 oz)     BP Readings from Last 3 Encounters:   04/04/23 (!) 150/98   01/24/22 126/86   12/10/20 123/86         Current Outpatient Medications   Medication     lisinopril (ZESTRIL) 10 MG tablet     No current facility-administered medications for this visit.       Social History     Tobacco Use     Smoking status: Never     Smokeless tobacco: Never   Substance Use Topics     Alcohol use: Yes     Drug use: No       Health Maintenance Due   Topic Date Due     ADVANCE CARE PLANNING  Never done     HEPATITIS B IMMUNIZATION (1 of 3 - 3-dose series) Never done     COVID-19 Vaccine (1) Never done     COLORECTAL CANCER SCREENING  Never done     HIV SCREENING  Never done     HEPATITIS C SCREENING  Never done     YEARLY PREVENTIVE VISIT  09/17/2020     ZOSTER IMMUNIZATION (1 of 2) Never done     INFLUENZA VACCINE (1) 09/01/2022       No results found for: PAP      April 4, 2023 2:12 PM

## 2023-04-04 NOTE — PROGRESS NOTES
CHIEF COMPLAINT: BP Check      HISTORY OF PRESENT ILLNESS:  Wilfredo Ochoa is a 52 year old male with a history of hypertension who presents for the above.     Wilfredo has a history of hypertension and is currently taking 10 mg daily. His initial BP in clinic was elevated at 150/98 today. At the dentist last week, his BP was 135/84. He has a home BP cuff and his at home readings are in the range 130s/80s. BP recheck 148/94 further into the visit. No family history of hypertension. He is interested in lifestyle changes he could make in order to stop his medication. He salts his food to taste and when he cooks, but not prior to tasting the food. Discussed weight loss and increased cardiovascular exercise. He plans to return to running in the summer, as he does not run in the winter.      MEDICATIONS:   Carefully Reviewed      REVIEW OF SYSTEMS:  10-point review of systems negative unless otherwise stated in the HPI.       OBJECTIVE:    EXAM:  GENERAL: He is in no distress.  Affect is upbeat.   Alert and oriented x3  BP (!) 150/98 (BP Location: Right arm, Patient Position: Sitting, Cuff Size: Adult Regular)   Pulse 91   Temp 98.1  F (36.7  C) (Skin)   Resp 15   Wt 92.5 kg (204 lb)   SpO2 96%   BMI 31.76 kg/m    HEENT:  Head is normocephalic, atraumatic. Nose and mouth are masked.      LABORATORY DATA: BMP pending. A1c 7.6%.    ASSESSMENT AND PLAN:   1. Essential hypertension: BMP ordered. Return to clinic in 4 months for an annual wellness visit. Will review progress of lifestyle changes and need for lisinopril at that time.  2. Elevated glucose level: A1c 7.6%. Up from 6.5%.  Therefore, officially a diagnosis of T2D.  Will discuss all facts of this with him.       IBonita, am serving as a scribe to document services personally performed by Dr. Shiv Us, based on data collection and the provider's statements to me. Dr. Us has reviewed, edited, and approved the above note.     IDr. Chaney  Magen, have interviewed and examined this patient, and I have thoroughly reviewed and edited this note and agree with its contents.

## 2023-04-07 VITALS
HEART RATE: 87 BPM | DIASTOLIC BLOOD PRESSURE: 84 MMHG | OXYGEN SATURATION: 96 % | TEMPERATURE: 98.1 F | RESPIRATION RATE: 15 BRPM | WEIGHT: 204 LBS | BODY MASS INDEX: 31.76 KG/M2 | SYSTOLIC BLOOD PRESSURE: 136 MMHG

## 2023-04-23 ENCOUNTER — HEALTH MAINTENANCE LETTER (OUTPATIENT)
Age: 53
End: 2023-04-23

## 2023-05-02 ENCOUNTER — MYC MEDICAL ADVICE (OUTPATIENT)
Dept: FAMILY MEDICINE | Facility: CLINIC | Age: 53
End: 2023-05-02

## 2023-05-02 DIAGNOSIS — I10 ESSENTIAL HYPERTENSION WITH GOAL BLOOD PRESSURE LESS THAN 140/90: ICD-10-CM

## 2023-05-02 RX ORDER — LISINOPRIL 10 MG/1
10 TABLET ORAL DAILY
Qty: 90 TABLET | Refills: 1 | Status: SHIPPED | OUTPATIENT
Start: 2023-05-02 | End: 2023-10-23

## 2023-05-02 NOTE — TELEPHONE ENCOUNTER
Sending 90 day supply plus one refill of lisinopril to pt pharmacy. Pt instructed to schedule annual physical at end of summer/early fall.    Antonio PHILIP, RN  05/02/23 9:20 AM

## 2023-07-22 ENCOUNTER — HEALTH MAINTENANCE LETTER (OUTPATIENT)
Age: 53
End: 2023-07-22

## 2023-08-15 ASSESSMENT — ENCOUNTER SYMPTOMS
NAUSEA: 0
CHILLS: 0
PARESTHESIAS: 0
JOINT SWELLING: 0
WEAKNESS: 0
SORE THROAT: 0
HEADACHES: 0
ABDOMINAL PAIN: 0
CONSTIPATION: 0
HEARTBURN: 0
ARTHRALGIAS: 0
MYALGIAS: 0
DIARRHEA: 0
PALPITATIONS: 0
DYSURIA: 0
HEMATURIA: 0
NERVOUS/ANXIOUS: 0
FREQUENCY: 0
FEVER: 0
COUGH: 0
DIZZINESS: 0
HEMATOCHEZIA: 0
EYE PAIN: 0
SHORTNESS OF BREATH: 0

## 2023-08-17 ENCOUNTER — LAB (OUTPATIENT)
Dept: FAMILY MEDICINE | Facility: CLINIC | Age: 53
End: 2023-08-17

## 2023-08-17 ENCOUNTER — OFFICE VISIT (OUTPATIENT)
Dept: FAMILY MEDICINE | Facility: CLINIC | Age: 53
End: 2023-08-17
Payer: COMMERCIAL

## 2023-08-17 VITALS
WEIGHT: 197.69 LBS | TEMPERATURE: 98.1 F | BODY MASS INDEX: 31.03 KG/M2 | SYSTOLIC BLOOD PRESSURE: 131 MMHG | OXYGEN SATURATION: 97 % | HEART RATE: 70 BPM | HEIGHT: 67 IN | DIASTOLIC BLOOD PRESSURE: 87 MMHG

## 2023-08-17 DIAGNOSIS — Z11.59 ENCOUNTER FOR HEPATITIS C SCREENING TEST FOR LOW RISK PATIENT: ICD-10-CM

## 2023-08-17 DIAGNOSIS — Z12.11 SCREEN FOR COLON CANCER: ICD-10-CM

## 2023-08-17 DIAGNOSIS — Z00.00 WELLNESS EXAMINATION: Primary | ICD-10-CM

## 2023-08-17 DIAGNOSIS — E11.9 TYPE 2 DIABETES MELLITUS WITHOUT COMPLICATION, WITHOUT LONG-TERM CURRENT USE OF INSULIN (H): ICD-10-CM

## 2023-08-17 DIAGNOSIS — Z13.220 SCREENING FOR LIPID DISORDERS: ICD-10-CM

## 2023-08-17 DIAGNOSIS — R73.09 ELEVATED GLUCOSE LEVEL: ICD-10-CM

## 2023-08-17 DIAGNOSIS — Z12.5 SCREENING FOR PROSTATE CANCER: ICD-10-CM

## 2023-08-17 LAB
ANION GAP SERPL CALCULATED.3IONS-SCNC: 13 MMOL/L (ref 7–15)
BUN SERPL-MCNC: 16.6 MG/DL (ref 6–20)
CALCIUM SERPL-MCNC: 9.6 MG/DL (ref 8.6–10)
CHLORIDE SERPL-SCNC: 102 MMOL/L (ref 98–107)
CHOLEST SERPL-MCNC: 182 MG/DL
CREAT SERPL-MCNC: 1.04 MG/DL (ref 0.67–1.17)
DEPRECATED HCO3 PLAS-SCNC: 24 MMOL/L (ref 22–29)
GFR SERPL CREATININE-BSD FRML MDRD: 86 ML/MIN/1.73M2
GLUCOSE SERPL-MCNC: 157 MG/DL (ref 70–99)
HBA1C MFR BLD: 8 %
HDLC SERPL-MCNC: 36 MG/DL
LDLC SERPL CALC-MCNC: 97 MG/DL
NONHDLC SERPL-MCNC: 146 MG/DL
POTASSIUM SERPL-SCNC: 4.6 MMOL/L (ref 3.4–5.3)
PSA SERPL DL<=0.01 NG/ML-MCNC: 0.37 NG/ML (ref 0–3.5)
SODIUM SERPL-SCNC: 139 MMOL/L (ref 136–145)
TRIGL SERPL-MCNC: 247 MG/DL

## 2023-08-17 PROCEDURE — G0103 PSA SCREENING: HCPCS | Performed by: FAMILY MEDICINE

## 2023-08-17 PROCEDURE — 80048 BASIC METABOLIC PNL TOTAL CA: CPT | Performed by: FAMILY MEDICINE

## 2023-08-17 PROCEDURE — 83036 HEMOGLOBIN GLYCOSYLATED A1C: CPT | Performed by: FAMILY MEDICINE

## 2023-08-17 PROCEDURE — 86803 HEPATITIS C AB TEST: CPT | Performed by: FAMILY MEDICINE

## 2023-08-17 PROCEDURE — 80061 LIPID PANEL: CPT | Performed by: FAMILY MEDICINE

## 2023-08-17 NOTE — NURSING NOTE
"53 year old    Chief Complaint   Patient presents with    Physical     No concerns            Blood pressure 131/87, pulse 70, temperature 98.1  F (36.7  C), temperature source Temporal, height 1.702 m (5' 7\"), weight 89.7 kg (197 lb 11 oz), SpO2 97 %. Body mass index is 30.96 kg/m .    Patient Active Problem List   Diagnosis    Essential hypertension with goal blood pressure less than 140/90    Seborrheic keratoses    Preventative health care    Type 2 diabetes mellitus without complication, without long-term current use of insulin (H)              Wt Readings from Last 2 Encounters:   08/17/23 89.7 kg (197 lb 11 oz)   04/04/23 92.5 kg (204 lb)       BP Readings from Last 3 Encounters:   08/17/23 131/87   04/04/23 136/84   01/24/22 126/86                Current Outpatient Medications   Medication    lisinopril (ZESTRIL) 10 MG tablet     No current facility-administered medications for this visit.              Social History     Tobacco Use    Smoking status: Never    Smokeless tobacco: Never   Substance Use Topics    Alcohol use: Yes     Comment: 2-3 drinks per month    Drug use: No              Health Maintenance Due   Topic Date Due    DIABETIC FOOT EXAM  Never done    ADVANCE CARE PLANNING  Never done    EYE EXAM  Never done    HEPATITIS B IMMUNIZATION (1 of 3 - 3-dose series) Never done    COVID-19 Vaccine (1) Never done    Pneumococcal Vaccine: Pediatrics (0 to 5 Years) and At-Risk Patients (6 to 64 Years) (1 - PCV) Never done    COLORECTAL CANCER SCREENING  Never done    HIV SCREENING  Never done    HEPATITIS C SCREENING  Never done    MICROALBUMIN  08/29/2017    ZOSTER IMMUNIZATION (1 of 2) Never done    LIPID  01/24/2023    A1C  07/04/2023            No results found for: PAP           August 17, 2023 9:54 AM    "

## 2023-08-17 NOTE — PROGRESS NOTES
CHIEF COMPLAINT: Annual Wellness Exam      HISTORY OF PRESENT ILLNESS:  Wilfredo Ochoa is a 53 year old male who presents for an annual wellness visit.  Overall, he is doing well. He wears reading glasses and eye care is due. No hearing concerns. His dental care is up to date. His BP is 131/87 today. Body mass index is 30.96 kg/m . From an immunization standpoint, he is due for the Shingrix vaccine series. He has never gotten a Covid vaccine.      He is due for baseline colon cancer screening, prefers Cologuard. Did not complete Cologuard after last visit, reordered today. He is due for prostate cancer screening. Discussed risks and benefits of PSA test. Patient elected to have PSA test.     Wilfredo has a history of type 2 diabetes mellitus, diet controlled. His most recent A1c was 7.6% on 4/4/2023, up from 6.5% on 1/25/2022. His BP is 131/87 today and he is currently taking lisinopril 10 mg daily. Denies cough. No lightheadedness. He has been working on lifestyle modifications in the hopes of being able to stop his lisinopril and to manage his diabetes. Either walking or running twice a day, for about an hour each. Trying to eat low carb (keto) diet at home. He has lost about 7 lbs, but believes he would have lost more except that his wife had a foot surgery and he had to provide home health care for her for 10 weeks before he was able to start these lifestyle changes later in the summer. His LDL was 85 on 1/24/2022 and he is not on any cholesterol medication. He is not on aspirin and does not smoke.      FAMILY, SOCIAL AND PAST SURGICAL HISTORIES: Updated      MEDICATIONS:   Carefully Reviewed      REVIEW OF SYSTEMS:  10-point review of systems negative unless otherwise stated in the HPI.       OBJECTIVE:    EXAM:    GENERAL: He is in no distress.  Affect is upbeat.   Alert and oriented x3  /87 (BP Location: Right arm, Patient Position: Sitting, Cuff Size: Adult Regular)   Pulse 70   Temp 98.1  F (36.7  " C) (Temporal)   Ht 1.702 m (5' 7\")   Wt 89.7 kg (197 lb 11 oz)   SpO2 97%   BMI 30.96 kg/m    HEENT:  Head is normocephalic, atraumatic. Ears clear bilaterally. No pain with palpation of the frontal or maxillary sinuses.  Eyes are grossly normal.  Nose is free of any congestion or discharge.  Teeth in good repair.  Mucous membranes are moist.  Throat looks benign.  Neck is supple without adenopathy or thyromegaly.  No carotid bruits are heard, no JVD.   BACK:  Smooth and straight.  No pain to percussion.  No CVA tenderness.   LUNGS:  Clear to auscultation bilaterally.   HEART:  Regular rate and rhythm without murmur.   EXTREMITIES:  Ankles free of any edema.   SKIN:  Warm and dry.       LABORATORY DATA: Labs, pending.      ASSESSMENT AND PLAN:   Annual Wellness Exam: Up to date with healthcare maintenance strategies.  Screening for lipid disorders: Lipid panel ordered.   Elevated glucose level: BMP ordered.   Screening for colon cancer: Cologuard ordered.  Screening for prostate cancer: PSA ordered.   Type 2 diabetes, at optimal diabetic care. A1c ordered: it came back at 8%. (Therefore, will initiate metformin  mg once daily.  Recheck A1c in ~3-4  months.) Blood pressure is less than 140/90. LDL was 85. He is not on aspirin and does not smoke.  Encounter for hepatitis C screening test for low risk patient: Hepatitis C Screen Reflex to HCV RNA Quant and Genotype ordered.   Follow up in 1 year or call if there are any questions or concerns.       I, Bonita Puckett, am serving as a scribe to document services personally performed by Dr. Shiv Us, based on data collection and the provider's statements to me. Dr. Us has reviewed, edited, and approved the above note.     I, Dr. Shiv Us, have interviewed and examined this patient, and I have thoroughly reviewed and edited this note and agree with its contents.    "

## 2023-08-18 LAB — HCV AB SERPL QL IA: NONREACTIVE

## 2023-08-20 ENCOUNTER — MYC MEDICAL ADVICE (OUTPATIENT)
Dept: FAMILY MEDICINE | Facility: CLINIC | Age: 53
End: 2023-08-20

## 2023-08-20 DIAGNOSIS — E11.9 TYPE 2 DIABETES MELLITUS WITHOUT COMPLICATION, WITHOUT LONG-TERM CURRENT USE OF INSULIN (H): Primary | ICD-10-CM

## 2023-08-20 PROBLEM — Z00.00 WELLNESS EXAMINATION: Status: ACTIVE | Noted: 2019-09-17

## 2023-08-21 RX ORDER — METFORMIN HCL 500 MG
500 TABLET, EXTENDED RELEASE 24 HR ORAL
Qty: 90 TABLET | Refills: 1 | Status: SHIPPED | OUTPATIENT
Start: 2023-08-21

## 2023-08-21 NOTE — TELEPHONE ENCOUNTER
Dr. Us starting pt on Metformin  mg daily and requesting pt come for lab-only appt in 3 months for Hemoglobin A1c.   MC message sent to pt to notify him of above info.    TAMERA Tavera, RN  08/21/23, 10:34 AM

## 2023-10-23 DIAGNOSIS — I10 ESSENTIAL HYPERTENSION WITH GOAL BLOOD PRESSURE LESS THAN 140/90: ICD-10-CM

## 2023-10-24 RX ORDER — LISINOPRIL 10 MG/1
10 TABLET ORAL DAILY
Qty: 90 TABLET | Refills: 1 | Status: SHIPPED | OUTPATIENT
Start: 2023-10-24 | End: 2024-04-17

## 2023-10-24 NOTE — TELEPHONE ENCOUNTER
Medication requested: lisinopril (ZESTRIL) 10 MG tablet   Last office visit: 8/17/23  [facility] appointments: none  Medication last refilled: 5/2/23  Last qualifying labs:   BP Readings from Last 3 Encounters:   08/17/23 131/87   04/04/23 136/84   01/24/22 126/86     Component      Latest Ref Rng 8/17/2023  11:08 AM   Potassium      3.4 - 5.3 mmol/L 4.6      Prescription approved per [facility] Refill Protocol.    Antonio PHILIP, RN  10/24/23 2:50 PM

## 2023-11-20 ENCOUNTER — LAB (OUTPATIENT)
Dept: LAB | Facility: CLINIC | Age: 53
End: 2023-11-20
Payer: COMMERCIAL

## 2023-11-20 DIAGNOSIS — E11.9 TYPE 2 DIABETES MELLITUS WITHOUT COMPLICATION, WITHOUT LONG-TERM CURRENT USE OF INSULIN (H): ICD-10-CM

## 2023-11-20 LAB — HBA1C MFR BLD: 7.4 %

## 2023-11-20 PROCEDURE — 83036 HEMOGLOBIN GLYCOSYLATED A1C: CPT

## 2024-04-17 DIAGNOSIS — I10 ESSENTIAL HYPERTENSION WITH GOAL BLOOD PRESSURE LESS THAN 140/90: ICD-10-CM

## 2024-04-18 RX ORDER — LISINOPRIL 10 MG/1
10 TABLET ORAL DAILY
Qty: 90 TABLET | Refills: 1 | Status: SHIPPED | OUTPATIENT
Start: 2024-04-18

## 2024-04-18 NOTE — TELEPHONE ENCOUNTER
Medication requested: lisinopril (ZESTRIL) 10 MG tablet   Last office visit: 8/17/23  [facility] appointments: none  Medication last refilled: 10/24/23; 90 + 1 refill  Last qualifying labs:   BP Readings from Last 3 Encounters:   08/17/23 131/87   04/04/23 136/84   01/24/22 126/86     Component      Latest Ref Rng 8/17/2023  11:08 AM   Potassium      3.4 - 5.3 mmol/L 4.6      Component      Latest Ref Rng 8/17/2023  11:08 AM   GFR Estimate      >60 mL/min/1.73m2 86      Prescription approved per [facility] Refill Protocol.    Antonio PHILIP, RN  04/18/24 4:24 PM

## 2024-04-21 ENCOUNTER — HEALTH MAINTENANCE LETTER (OUTPATIENT)
Age: 54
End: 2024-04-21

## 2024-09-08 ENCOUNTER — HEALTH MAINTENANCE LETTER (OUTPATIENT)
Age: 54
End: 2024-09-08

## 2024-10-14 DIAGNOSIS — I10 ESSENTIAL HYPERTENSION WITH GOAL BLOOD PRESSURE LESS THAN 140/90: ICD-10-CM

## 2024-10-15 RX ORDER — LISINOPRIL 10 MG/1
10 TABLET ORAL DAILY
Qty: 90 TABLET | Refills: 0 | Status: SHIPPED | OUTPATIENT
Start: 2024-10-15

## 2024-10-15 NOTE — TELEPHONE ENCOUNTER
Medication requested: lisinopril (ZESTRIL) 10 MG tablet   Last office visit: 8/17/23  [facility] appointments: 11/25/24  Medication last refilled: 4/18/24; 90 + 1 refill  Last qualifying labs:   BP Readings from Last 3 Encounters:   08/17/23 131/87   04/04/23 136/84   01/24/22 126/86     Component      Latest Ref Rng 8/17/2023  11:08 AM   Potassium      3.4 - 5.3 mmol/L 4.6      Component      Latest Ref Rng 8/17/2023  11:08 AM   GFR Estimate      >60 mL/min/1.73m2 86      Medication is being filled for 1 time refill only due to:  Patient needs to be seen because it has been more than one year since last visit.    Antonio PHILIP, RN  10/15/24 9:04 AM     poor plus

## 2024-11-17 ENCOUNTER — HEALTH MAINTENANCE LETTER (OUTPATIENT)
Age: 54
End: 2024-11-17

## 2024-11-20 SDOH — HEALTH STABILITY: PHYSICAL HEALTH: ON AVERAGE, HOW MANY DAYS PER WEEK DO YOU ENGAGE IN MODERATE TO STRENUOUS EXERCISE (LIKE A BRISK WALK)?: 4 DAYS

## 2024-11-20 SDOH — HEALTH STABILITY: PHYSICAL HEALTH: ON AVERAGE, HOW MANY MINUTES DO YOU ENGAGE IN EXERCISE AT THIS LEVEL?: 60 MIN

## 2024-11-20 ASSESSMENT — SOCIAL DETERMINANTS OF HEALTH (SDOH): HOW OFTEN DO YOU GET TOGETHER WITH FRIENDS OR RELATIVES?: MORE THAN THREE TIMES A WEEK

## 2024-11-25 ENCOUNTER — OFFICE VISIT (OUTPATIENT)
Dept: FAMILY MEDICINE | Facility: CLINIC | Age: 54
End: 2024-11-25
Payer: COMMERCIAL

## 2024-11-25 VITALS
WEIGHT: 184 LBS | HEART RATE: 74 BPM | DIASTOLIC BLOOD PRESSURE: 87 MMHG | HEIGHT: 67 IN | OXYGEN SATURATION: 98 % | TEMPERATURE: 98 F | RESPIRATION RATE: 12 BRPM | BODY MASS INDEX: 28.88 KG/M2 | SYSTOLIC BLOOD PRESSURE: 136 MMHG

## 2024-11-25 DIAGNOSIS — L42 PITYRIASIS ROSEA: ICD-10-CM

## 2024-11-25 DIAGNOSIS — E11.9 TYPE 2 DIABETES MELLITUS WITHOUT COMPLICATION, WITHOUT LONG-TERM CURRENT USE OF INSULIN (H): ICD-10-CM

## 2024-11-25 DIAGNOSIS — Z00.00 WELLNESS EXAMINATION: Primary | ICD-10-CM

## 2024-11-25 DIAGNOSIS — Z13.220 SCREENING FOR LIPID DISORDERS: ICD-10-CM

## 2024-11-25 DIAGNOSIS — Z12.11 SCREEN FOR COLON CANCER: ICD-10-CM

## 2024-11-25 DIAGNOSIS — I10 ESSENTIAL HYPERTENSION WITH GOAL BLOOD PRESSURE LESS THAN 140/90: ICD-10-CM

## 2024-11-25 LAB
ANION GAP SERPL CALCULATED.3IONS-SCNC: 9 MMOL/L (ref 7–15)
BUN SERPL-MCNC: 16.3 MG/DL (ref 6–20)
CALCIUM SERPL-MCNC: 9.4 MG/DL (ref 8.8–10.4)
CHLORIDE SERPL-SCNC: 100 MMOL/L (ref 98–107)
CHOLEST SERPL-MCNC: 168 MG/DL
CREAT SERPL-MCNC: 1.04 MG/DL (ref 0.67–1.17)
CREAT UR-MCNC: 120 MG/DL
EGFRCR SERPLBLD CKD-EPI 2021: 86 ML/MIN/1.73M2
EST. AVERAGE GLUCOSE BLD GHB EST-MCNC: 235 MG/DL
FASTING STATUS PATIENT QL REPORTED: YES
FASTING STATUS PATIENT QL REPORTED: YES
GLUCOSE SERPL-MCNC: 271 MG/DL (ref 70–99)
HBA1C MFR BLD: 9.8 % (ref 0–5.6)
HCO3 SERPL-SCNC: 25 MMOL/L (ref 22–29)
HDLC SERPL-MCNC: 33 MG/DL
LDLC SERPL CALC-MCNC: 72 MG/DL
MICROALBUMIN UR-MCNC: 17.6 MG/L
MICROALBUMIN/CREAT UR: 14.67 MG/G CR (ref 0–17)
NONHDLC SERPL-MCNC: 135 MG/DL
POTASSIUM SERPL-SCNC: 4.1 MMOL/L (ref 3.4–5.3)
SODIUM SERPL-SCNC: 134 MMOL/L (ref 135–145)
TRIGL SERPL-MCNC: 317 MG/DL

## 2024-11-25 PROCEDURE — 82374 ASSAY BLOOD CARBON DIOXIDE: CPT | Performed by: FAMILY MEDICINE

## 2024-11-25 PROCEDURE — 82570 ASSAY OF URINE CREATININE: CPT | Performed by: FAMILY MEDICINE

## 2024-11-25 PROCEDURE — 80048 BASIC METABOLIC PNL TOTAL CA: CPT | Performed by: FAMILY MEDICINE

## 2024-11-25 PROCEDURE — 80061 LIPID PANEL: CPT | Performed by: FAMILY MEDICINE

## 2024-11-25 PROCEDURE — 82043 UR ALBUMIN QUANTITATIVE: CPT | Performed by: FAMILY MEDICINE

## 2024-11-25 RX ORDER — LISINOPRIL 10 MG/1
10 TABLET ORAL DAILY
Qty: 90 TABLET | Refills: 4 | Status: SHIPPED | OUTPATIENT
Start: 2024-11-25

## 2024-11-25 RX ORDER — KETOCONAZOLE 200 MG/1
TABLET ORAL
Qty: 10 TABLET | Refills: 1 | Status: SHIPPED | OUTPATIENT
Start: 2024-11-25

## 2024-11-25 RX ORDER — METFORMIN HYDROCHLORIDE 500 MG/1
500 TABLET, EXTENDED RELEASE ORAL
Qty: 90 TABLET | Refills: 4 | Status: SHIPPED | OUTPATIENT
Start: 2024-11-25

## 2024-11-25 NOTE — PROGRESS NOTES
Preventive Care Visit  [facility]  Shiv Us MD, Family Medicine  Nov 25, 2024    Abraham Villalobos is a 54 year old, presenting for the following:  No chief complaint on file.      HPI    ***        11/20/2024   General Health   How would you rate your overall physical health? Good   Feel stress (tense, anxious, or unable to sleep) To some extent      (!) STRESS CONCERN      11/20/2024   Nutrition   Three or more servings of calcium each day? Yes   Diet: Carbohydrate counting   How many servings of fruit and vegetables per day? (!) 2-3   How many sweetened beverages each day? 0-1            11/20/2024   Exercise   Days per week of moderate/strenous exercise 4 days   Average minutes spent exercising at this level 60 min            11/20/2024   Social Factors   Frequency of gathering with friends or relatives More than three times a week   Worry food won't last until get money to buy more No   Food not last or not have enough money for food? No   Do you have housing? (Housing is defined as stable permanent housing and does not include staying ouside in a car, in a tent, in an abandoned building, in an overnight shelter, or couch-surfing.) Yes   Are you worried about losing your housing? No   Lack of transportation? No   Unable to get utilities (heat,electricity)? No            11/20/2024   Fall Risk   Fallen 2 or more times in the past year? No    Trouble with walking or balance? No        Patient-reported          11/20/2024   Dental   Dentist two times every year? Yes            11/20/2024   TB Screening   Were you born outside of the [place]? No      Today's PHQ-2 Score:       11/25/2024     7:44 AM   PHQ-2 ( 1999 [employer])   Q1: Little interest or pleasure in doing things 0    Q2: Feeling down, depressed or hopeless 1    PHQ-2 Score 1    Q1: Little interest or pleasure in doing things Not at all   Q2: Feeling down, depressed or hopeless Several days   PHQ-2 Score 1       Patient-reported            "11/20/2024   Substance Use   Alcohol more than 3/day or more than 7/wk No   Do you use any other substances recreationally? No        Social History     Tobacco Use    Smoking status: Never    Smokeless tobacco: Never   Substance Use Topics    Alcohol use: Yes     Comment: 2-3 drinks per month    Drug use: No           11/20/2024   One time HIV Screening   Previous HIV test? No          11/20/2024   STI Screening   New sexual partner(s) since last STI/HIV test? (!) DECLINE      ASCVD Risk   The ASCVD Risk score (Zoie GAYTAN, et al., 2019) failed to calculate for the following reasons:    The systolic blood pressure is missing      Review of Systems  Constitutional, neuro, ENT, endocrine, pulmonary, cardiac, gastrointestinal, genitourinary, musculoskeletal, integument and psychiatric systems are negative, except as otherwise noted.     Objective    Exam  There were no vitals taken for this visit.   Estimated body mass index is 30.96 kg/m  as calculated from the following:    Height as of 8/17/23: 1.702 m (5' 7\").    Weight as of 8/17/23: 89.7 kg (197 lb 11 oz).    Physical Exam  GENERAL: alert and no distress  EYES: Eyes grossly normal to inspection, PERRL and conjunctivae and sclerae normal  HENT: ear canals and TM's normal, nose and mouth without ulcers or lesions  NECK: no adenopathy, no asymmetry, masses, or scars  RESP: lungs clear to auscultation - no rales, rhonchi or wheezes  CV: regular rate and rhythm, normal S1 S2, no S3 or S4, no murmur, click or rub, no peripheral edema  MS: no gross musculoskeletal defects noted, no edema  SKIN: no suspicious lesions or rashes  NEURO: Normal strength and tone, mentation intact and speech normal  PSYCH: mentation appears normal, affect normal/bright        Signed Electronically by: Shiv Us MD  " "(36.7  C) (Skin)   Resp 12   Ht 1.704 m (5' 7.09\")   Wt 83.5 kg (184 lb)   SpO2 98%   BMI 28.74 kg/m       Estimated body mass index is 28.74 kg/m  as calculated from the following:    Height as of this encounter: 1.704 m (5' 7.09\").    Weight as of this encounter: 83.5 kg (184 lb).    Physical Exam  GENERAL: alert and no distress  EYES: Eyes grossly normal to inspection, PERRL and conjunctivae and sclerae normal  HENT: ear canals and TM's normal, nose and mouth without ulcers or lesions  NECK: no adenopathy, no asymmetry, masses, or scars  RESP: lungs clear to auscultation - no rales, rhonchi or wheezes  CV: regular rate and rhythm, normal S1 S2, no S3 or S4, no murmur, click or rub, no peripheral edema  MS: no gross musculoskeletal defects noted, no edema  SKIN: no suspicious lesions or rashes.  There is a scattered, faint, salmon-colored rash in different locations on his trunk.  NEURO: Normal strength and tone, mentation intact and speech normal.  Monofilament testing of the feet is normal throughout all 10 toes.  PSYCH: mentation appears normal, affect normal/bright    Laboratory studies: Lipid panel, A1c, BMP, urine microalbumin, all pending      Assessment/Plan:    Wilfredo is a 54-year-old here today for his annual wellness visit.  Overall, he is up-to-date with most healthcare maintenance strategies, though he declines a number of immunizations.    Type 2 diabetes without complication, without long-term current use of insulin.  He should be at optimal care to the extent that can be.    He will be notified of the laboratory results.    Cologuard test pending for colon cancer screening purposes.    Probable foot/possible pityriasis rosea.  Will try ketoconazole 200 mg.  He will take 1 tablet, wait approximately 2 hours, then exercise to the point of sweating.  He can repeat this once if necessary.    I look forward to seeing Wilfredo back in approximately 6 months for his next diabetic follow-up.  I look " forward to seeing him back in approximately 1 year for his next annual wellness visit.      Signed Electronically by: Shiv Us MD

## 2024-11-25 NOTE — NURSING NOTE
"Wilfredo  54 year old    Chief Complaint   Patient presents with    Physical            Blood pressure 136/87, pulse 74, temperature 98  F (36.7  C), temperature source Skin, resp. rate 12, height 1.704 m (5' 7.09\"), weight 83.5 kg (184 lb), SpO2 98%. Body mass index is 28.74 kg/m .    Patient Active Problem List   Diagnosis    Essential hypertension with goal blood pressure less than 140/90    Seborrheic keratoses    Wellness examination    Type 2 diabetes mellitus without complication, without long-term current use of insulin (H)              Wt Readings from Last 2 Encounters:   11/25/24 83.5 kg (184 lb)   08/17/23 89.7 kg (197 lb 11 oz)       BP Readings from Last 3 Encounters:   11/25/24 136/87   08/17/23 131/87   04/04/23 136/84                Current Outpatient Medications   Medication Sig Dispense Refill    lisinopril (ZESTRIL) 10 MG tablet Take 1 tablet (10 mg) by mouth daily. 90 tablet 0    metFORMIN (GLUCOPHAGE XR) 500 MG 24 hr tablet Take 1 tablet (500 mg) by mouth daily (with dinner) 90 tablet 1     No current facility-administered medications for this visit.              Social History     Tobacco Use    Smoking status: Never    Smokeless tobacco: Never   Substance Use Topics    Alcohol use: Yes     Comment: 2-3 drinks per month    Drug use: No              Health Maintenance Due   Topic Date Due    DIABETIC FOOT EXAM  Never done    ADVANCE CARE PLANNING  Never done    Pneumococcal Vaccine: Pediatrics (0 to 5 Years) and At-Risk Patients (6 to 64 Years) (1 of 2 - PCV) Never done    COLORECTAL CANCER SCREENING  Never done    HIV SCREENING  Never done    HEPATITIS B IMMUNIZATION (1 of 3 - 19+ 3-dose series) Never done    MICROALBUMIN  08/29/2017    ZOSTER IMMUNIZATION (1 of 2) Never done    EYE EXAM  10/26/2021    A1C  02/20/2024    BMP  08/17/2024    LIPID  08/17/2024    INFLUENZA VACCINE (1) 09/01/2024    COVID-19 Vaccine (1 - 2024-25 season) Never done            No results found for: \"PAP\"     "       November 25, 2024 7:54 AM

## 2025-03-08 ENCOUNTER — HEALTH MAINTENANCE LETTER (OUTPATIENT)
Age: 55
End: 2025-03-08

## 2025-06-22 ENCOUNTER — HEALTH MAINTENANCE LETTER (OUTPATIENT)
Age: 55
End: 2025-06-22

## (undated) RX ORDER — LIDOCAINE HYDROCHLORIDE AND EPINEPHRINE 10; 10 MG/ML; UG/ML
INJECTION, SOLUTION INFILTRATION; PERINEURAL
Status: DISPENSED
Start: 2019-10-14

## (undated) RX ORDER — ERYTHROMYCIN 5 MG/G
OINTMENT OPHTHALMIC
Status: DISPENSED
Start: 2019-10-14